# Patient Record
Sex: FEMALE | Race: WHITE | NOT HISPANIC OR LATINO | Employment: PART TIME | URBAN - METROPOLITAN AREA
[De-identification: names, ages, dates, MRNs, and addresses within clinical notes are randomized per-mention and may not be internally consistent; named-entity substitution may affect disease eponyms.]

---

## 2021-05-25 ENCOUNTER — RECORDS - HEALTHEAST (OUTPATIENT)
Dept: ADMINISTRATIVE | Facility: CLINIC | Age: 71
End: 2021-05-25

## 2022-08-04 ENCOUNTER — OFFICE VISIT (OUTPATIENT)
Dept: VASCULAR SURGERY | Facility: CLINIC | Age: 72
End: 2022-08-04
Payer: COMMERCIAL

## 2022-08-04 DIAGNOSIS — I83.93 SPIDER VEINS OF BOTH LOWER EXTREMITIES: Primary | ICD-10-CM

## 2022-08-04 PROCEDURE — 99202 OFFICE O/P NEW SF 15 MIN: CPT | Performed by: SURGERY

## 2022-08-04 NOTE — PROGRESS NOTES
After Visit Follow Up  Per Dr. Elam, patient was recommended to have 2 - 4 sessions at $400 of cosmetic sclerotherapy.    Reviewed with and gave to patient our vein clinic sclerotherapy packet of information which includes basic sclerotherapy information, pre-treatment instructions and post-treatment instructions. Discussed compression hose. Explained to patient if insurance doesn't cover the compression hose (which they probably do not) there are a couple different options ([employer] from [employer] or compression giddy) to getting them and to call the clinic to let us know if they need help. Pt is aware she needs PETITE length.    Patient in agreement with plan and had no further questions.    Natalia Colindres RN  [facility]  Vein Clinic

## 2022-08-04 NOTE — PATIENT INSTRUCTIONS
Sclerotherapy: Pre-Treatment Instructions    Recommended Sessions:  ______ treatment sessions    Pricing: Full session - $407                 *Payment is due at the time of visit following the treatment    Time Required per Treatment Session - About 45 minutes  Please come in 15 minutes before your scheduled appointment.  30 min.  Sclerotherapy treatments last approximately 30 minutes.  5 min.    A staff member will wipe your legs off with warm water and dry them with a wash cloth.                 Then you can put your compression hose on, get dressed and check out.  10 min.  After your treatment, you will be asked to walk around for 10 minutes before you get in your car.    Medications  Five days before your appointment, discontinue aspirin (Bufferin, Anacin, etc.) and Ibuprofen (Motrin, Advil, Aleve, etc.) to reduce bruising. Resume these medications the day following the treatment.    Leg Preparation  Do not shave your legs or apply any oil, lotion or powder the night before or the day of your treatment.    Clothing  Shorts:  Bring a pair of loose, comfortable shorts to wear during your treatment (or you can choose to wear ours). Shoes: Bring comfortable shoes to accommodate the compression hose after your treatment. Do not wear flip-flops or thong-style sandals unless you have open-toe compression hose.    Photographs  Photos will be taken before each treatment. This helps monitor your progress.    Injections  The physician will inject your veins with the sclerotherapy solution chosen to meet your specific needs.    Compression Hose  Please bring your compression hose if you have them. They may also be reserved for you at our clinic. Compression hose must be worn immediately after each sclerotherapy treatment.  The hose must be compression level 20-30, and they must be worn for 24 hours straight after your treatment.  If you have never worn compression hose before, a staff member will teach you how to put  them on.             You cannot have a treatment without compression hose.               They are critical to the success of your treatment!    You may purchase your compression hose from us. We will measure you and have the hose available when you come for your treatment.    Cancellation and Rescheduling  If you need to cancel or reschedule your sclerotherapy treatment, please give our office at least 24 hour notice.    Sclerotherapy: Basic Information        What is sclerotherapy?  Sclerotherapy is a treatment for  spider  veins.  Spider  veins are small veins just under your skin that can look red, blue or purple. Most  spider  veins are only a cosmetic problem.  Spider  veins are not useful and treating them will not affect your circulation.    How does sclerotherapy work?  1.  Injections: A very small needle is used to inject a solution into the  spider  veins. The solution irritates the cells that line the vein walls. This causes the veins to collapse. The vein walls to stick together and they can no longer carry blood. Different solutions are used based on the size of the veins.  2.  Compression:  The spider veins are kept collapsed by wearing compression stockings. Your body will break down and absorb the treated veins. You wear the compression hose for 24 hours after the treatment and then for 4 more days during your waking hours only.    How does the body heal after sclerotherapy?  The process is similar to how your body heals after a bad bruise. It takes 4-6 weeks or more for the healing to be complete. When the healing is complete, the vein is no longer visible. It may take more than one treatment.    How do I get the best results?  It is important to follow the post-sclerotherapy instructions. The best results require time and patience. The injection sites will continue to heal and fade for months after a treatment. Please discuss your expectations with your doctor to keep them realistic. Your doctor  will do everything possible to meet or exceed your expectations.    How many treatments are needed?  After your initial exam, your doctor will give you an estimate of the number of treatments that may be required. It depends upon the size, type, and quantity of your  spider  veins and on the doctor's assessment, your history and expectations. You may end up needing fewer or more treatments.    How soon can I have another treatment?  Additional treatments are scheduled every 4-6 weeks to allow time for the body to respond to the previous treatment.    Common Side Effects:  Itching  The areas that were injected may itch. This is usually mild and lasts less than a day. Do not use lotions or creams on your legs until the injection sites have healed over.    Pain  It is common to have some tenderness at the injection sites. Injection of the solution can be uncomfortable, but is usually well tolerated by most patients. The tenderness is temporary, lasting 24 hours at most. Tylenol or Ibuprofen can be used, if needed, following the product directions.    Bruising  This may occur at the injections sites. Bruising may be minimized by avoiding Aspirin and Ibuprofen products for five days before each treatment session.    Hyperpigmentation  A light brown discoloration of the skin may develop along the veins in the areas injected. Approximately 20-30% of patients treated note the discoloration (which is lighter and less obvious than the veins that are being treated). The hyperpigmentation usually fades in a couple of weeks, but may take several months to a year to totally resolve. There is a 1% chance of hyperpigmentation continuing after one year.    Trapped blood  A small amount of blood may become trapped and hardened in the veins. This may feel like a knot or cord and it may look dark blue or bruised. This is a common occurrence. You may need to return before your next treatment so this area can be drained to remove the  trapped blood. This will reduce the hyperpigmentation that can occur. The chance of this occurring can be decreased with proper use of compression hose after your treatment.    Matting  Matting is the formation of new, fine  spider  veins in the area injected.  It occurs in approximately 10% of patients injected. The exact reason for this is unknown. If untreated, the matting usually resolves in 3 to 12 months, but very rarely, it can be permanent. If the matting does not fade, it can be re-injected.    Rare Side Effects:  Ulceration at injection sites  Very rarely, a small ulcer will occur at the site where a vein is injected. An ulcer can take 4 to 6 weeks to completely heal. A small scar may result.    Allergic reactions  There is a very rare incidence of an allergic reaction to the solution injected. You will be observed for such reaction and will be treated appropriately should it occur. Please inform us of any allergy history.    Pulmonary embolus/Deep vein thrombosis  This is a blood clot which moves to the lungs/a blood clot in the deep vein system. There is an extraordinarily low incidence of this complication.      SCLEROTHERAPY AFTER CARE  Immediately:  After treatment, walk for 10-15 minutes before getting in your car.  If your trip home is more than 1 hour, stop and walk around for 5-10 minutes. Avoid sitting or standing for extended periods.   First 24 hours: Wear your compression continuously, even while in bed. After the 24 hours, you may shower if you want to. Put your hose back on, unless you are going to bed. You should NOT wear compression to bed after the first 24 hours. You may fly the next day, but wear your compression.   For 5 days: Wear the compression hose for waking hours only. You may continue to wear them longer than 5 days if you prefer.   For days 5-7: Walking is encouraged, as it promotes efficient circulation in your veins. You may do activities that raise your heart rate, but do  NOT run, jog, do high impact aerobics, or weight lifting. After 7 days, no activity restrictions.  Shaving: Wait a few days to shave or apply lotion.   Bathing: Do NOT take hot baths or sit in a hot tub for 7-10 days.    For 1 year: Wear SPF 30 sunscreen on your legs when in the sun. This is very important! It helps prevent darkening of the skin at the injection sites.   Medications: You may resume your usual medications, including aspirin or ibuprofen.    Common Things to Expect       Compression must be worn for the first 24 hours and then during the day for 5-7 days.    If larger veins are treated with ultrasound-guided sclerotherapy, you will have redness, firmness, tenderness, and swelling.  This firmness and tenderness may take 3-6 months to resolve. Ibuprofen and compression hose will aid in this process.    You will have bruising that can last up to 3 weeks. Most fading of the veins will occur between 3 and 6 weeks after treatment.    You may notice brown discoloration (hyperpigmentation) at the treatment site.  This should fade with time, but will take 3 months to 1 year to fully heal.     Some treated veins may look darker because of trapped blood within the vein. This trapped blood can be removed at a minimum of 1 month following treatment. Larger veins are more likely to develop trapped blood.    It is very important for you to use at least SPF 30 sunscreen in order to help prevent the discoloration of your skin.    Migraines rarely occur following sclerotherapy, but are more likely in patients with a history of migraines.  Treat as you would any other migraine.      [employer] last reviewed this educational content on 11/1/2019 2000-2021 [employer]. All rights reserved. This information is not intended as a substitute for professional medical care. Always follow your healthcare professional's instructions.

## 2022-08-04 NOTE — NURSING NOTE
Patient Reported symptoms:     Bilateral leg   Heaviness Some of the time   Achiness A little of the time   Swelling Most of the time   Throbbing None of the time   Itching Some of the time   Appearance Very noticeable   Impact on work/activities Symptoms but full able to participate

## 2022-08-04 NOTE — PROGRESS NOTES
Noemi Mccord is a very pleasant 72-year-old female who is accompanied by her sister today.  She comes to us with unsightly bilateral thigh spider veins.  She also has scattered varicose veins in both calf areas.  She really does not feel pain in the varicose veins but is concerned about the appearance of the spider veins.    He has a standing job and she works as a .  She previously had a job at [employer].    On examination she has large and multiple clusters of spider veins in both the thighs, anteriorly and posteriorly.  She has smaller cluster of nontender varicose veins in both calf areas.    I explained to her that treatment of varicose veins would not be necessary since she has not reported any real symptoms.  Treatment of spider veins is cosmetic.  I have given her a prescription of thigh-high compression stockings.  We will plan to do this sometime in October of this year when the weather is cooler so she can wear the compression stockings.  At this point she wants to enjoy her summers and be outside and in the sun.    We look forward to seeing her in the fall of this year for cosmetic bilateral thigh injection sclerotherapy.

## 2022-10-06 ENCOUNTER — OFFICE VISIT (OUTPATIENT)
Dept: VASCULAR SURGERY | Facility: CLINIC | Age: 72
End: 2022-10-06
Payer: COMMERCIAL

## 2022-10-06 DIAGNOSIS — I83.93 SPIDER VEINS OF BOTH LOWER EXTREMITIES: Primary | ICD-10-CM

## 2022-10-06 PROCEDURE — 36468 NJX SCLRSNT SPIDER VEINS: CPT | Performed by: SURGERY

## 2022-10-06 PROCEDURE — S9999 SALES TAX: HCPCS | Mod: 25 | Performed by: SURGERY

## 2022-10-06 NOTE — PROGRESS NOTES
Vein Clinic Sclerotherapy Note     Noemi Mccord is a 72 year old female who was seen in clinic today for Sclerotherapy.    Sclerotherapy    Date/Time: 10/6/2022 3:31 PM  Performed by: Bonilla Elam MD  Authorized by: Bonilla Elam MD     Type:  Cosmetic  Session:  Full  Procedure side:  Right  Solution/Amount:  .5 POLIDOCANOL  Syringes:  10  Patient tolerance:  Patient tolerated the procedure well with no immediate complications  Wrap/Hose:  Charles Elam M.D.

## 2022-12-01 ENCOUNTER — OFFICE VISIT (OUTPATIENT)
Dept: VASCULAR SURGERY | Facility: CLINIC | Age: 72
End: 2022-12-01
Payer: COMMERCIAL

## 2022-12-01 DIAGNOSIS — I83.93 SPIDER VEINS OF BOTH LOWER EXTREMITIES: Primary | ICD-10-CM

## 2022-12-01 PROCEDURE — 99212 OFFICE O/P EST SF 10 MIN: CPT | Performed by: SURGERY

## 2022-12-01 NOTE — PROGRESS NOTES
Mrs. Noemi Mccord underwent cosmetic sclerotherapy of right lower extremity spider veins on 6 October 2022.  On evaluation today there are multiple thrombosed clusters of spider veins in the right lower extremity which were treated previously.    I just do not think that they have shown the kind of progress either 1 of us expected.  I have asked her to start massaging the area of the thrombosed spider veins with vitamin A&E ointment at least once a day to effect distribution and improve the cosmetic appearance of those.  Initially our plan was to do sclerotherapy of the varicose veins of the left lower extremity but I will postpone that till I get a better result with the right side.

## 2023-03-02 ENCOUNTER — OFFICE VISIT (OUTPATIENT)
Dept: VASCULAR SURGERY | Facility: CLINIC | Age: 73
End: 2023-03-02
Payer: COMMERCIAL

## 2023-03-02 DIAGNOSIS — I83.93 SPIDER VEINS OF BOTH LOWER EXTREMITIES: Primary | ICD-10-CM

## 2023-03-02 PROCEDURE — S9999 SALES TAX: HCPCS | Performed by: SURGERY

## 2023-03-02 PROCEDURE — 36468 NJX SCLRSNT SPIDER VEINS: CPT | Performed by: SURGERY

## 2023-03-02 NOTE — PROGRESS NOTES
Vein Clinic Procedure Note    Sclerotherapy    Date/Time: 3/2/2023 12:20 PM  Performed by: Bonilla Elam MD  Authorized by: Bonilla Elam MD     Type:  Cosmetic  Session:  Full  Procedure side:  Left  Solution/Amount:  .5 POLIDOCANOL  Syringes:  10  Patient tolerance:  Patient tolerated the procedure well with no immediate complications  Wrap/Hose:  Charles Elam MD

## 2023-04-06 ENCOUNTER — OFFICE VISIT (OUTPATIENT)
Dept: VASCULAR SURGERY | Facility: CLINIC | Age: 73
End: 2023-04-06
Payer: COMMERCIAL

## 2023-04-06 DIAGNOSIS — I78.1 ASYMPTOMATIC SPIDER VEINS OF BOTH LOWER EXTREMITIES: Primary | ICD-10-CM

## 2023-04-06 PROCEDURE — 99207 PR VEINSOLUTIONS POST OPERATIVE VISIT: CPT | Performed by: SURGERY

## 2023-04-06 NOTE — PROGRESS NOTES
Mrs. Mccord underwent left lower extremity injection sclerotherapy on 2 March 2023.  She has had no complaints.  I did a thorough review of her lower extremity and she had thrombosed off most of the spider veins that had been treated.  These, however, still in the phases of thrombosis.  I would not advise any additional injections today.    We will see her back in 2 months and reassess if she needs additional injections.

## 2023-06-08 ENCOUNTER — OFFICE VISIT (OUTPATIENT)
Dept: VASCULAR SURGERY | Facility: CLINIC | Age: 73
End: 2023-06-08
Payer: COMMERCIAL

## 2023-06-08 DIAGNOSIS — I78.1 ASYMPTOMATIC SPIDER VEINS OF BOTH LOWER EXTREMITIES: Primary | ICD-10-CM

## 2023-06-08 PROCEDURE — 99207 PR VEINSOLUTIONS NO CHARGE VISIT: CPT | Performed by: SURGERY

## 2023-06-08 NOTE — PROGRESS NOTES
After Visit Follow Up  Per Dr. Elam, patient was recommended to have 2 - 4 sessions at $407 of cosmetic sclerotherapy.    Reviewed with and gave to patient our vein clinic sclerotherapy packet of information which includes basic sclerotherapy information, pre-treatment instructions and post-treatment instructions.    Patient in agreement with plan and had no further questions.    Macy Hunter Houselog on 6/8/2023 at 11:39 AM

## 2023-06-08 NOTE — PROGRESS NOTES
This is a nonbillable visit.    Noemi is here for an opinion.  She has had bilateral lower extremity sclerotherapy for cosmetic reasons.  She still has residual veins that will need treatment.  She unfortunately only brought 1 compression stocking and I think both legs warrant treatment.    We will schedule a follow-up visit.  She can bring both compression stockings.  We will do 1 full session with 10 injections.

## 2023-07-13 ENCOUNTER — OFFICE VISIT (OUTPATIENT)
Dept: VASCULAR SURGERY | Facility: CLINIC | Age: 73
End: 2023-07-13
Payer: COMMERCIAL

## 2023-07-13 DIAGNOSIS — I83.93 SPIDER VEINS OF BOTH LOWER EXTREMITIES: Primary | ICD-10-CM

## 2023-07-13 PROCEDURE — S9999 SALES TAX: HCPCS | Performed by: SURGERY

## 2023-07-13 PROCEDURE — 36468 NJX SCLRSNT SPIDER VEINS: CPT | Performed by: SURGERY

## 2023-07-13 NOTE — PROGRESS NOTES
Vein Clinic Procedure Note    Sclerotherapy    Date/Time: 7/13/2023 1:57 PM    Performed by: Bonilla Elam MD  Authorized by: Bonilla Elam MD    Type:  Cosmetic  Session:  Full  Procedure side:  Bilateral  Solution/Amount:  .5 POLIDOCANOL  Syringes:  10  Patient tolerance:  Patient tolerated the procedure well with no immediate complications  Wrap/Hose:  Charles Elam MD

## 2023-07-13 NOTE — LETTER
7/13/2023         RE: Noemi Mccord  [address]  [place] [facility] [address]        Dear Colleague,    Thank you for referring your patient, Noemi Mccord, to the [facility] VEIN CLIN[facility] [place]. Please see a copy of my visit note below.        Vein Clinic Procedure Note    Sclerotherapy    Date/Time: 7/13/2023 1:57 PM    Performed by: Bonilla Elam MD  Authorized by: Bonilla Elam MD    Type:  Cosmetic  Session:  Full  Procedure side:  Bilateral  Solution/Amount:  .5 POLIDOCANOL  Syringes:  10  Patient tolerance:  Patient tolerated the procedure well with no immediate complications  Wrap/Hose:  Hose        Bonilla Elam MD      Again, thank you for allowing me to participate in the care of your patient.        Sincerely,        Bonilla Elam MD

## 2023-10-12 ENCOUNTER — OFFICE VISIT (OUTPATIENT)
Dept: VASCULAR SURGERY | Facility: CLINIC | Age: 73
End: 2023-10-12
Payer: COMMERCIAL

## 2023-10-12 DIAGNOSIS — I83.93 SPIDER VEINS OF BOTH LOWER EXTREMITIES: Primary | ICD-10-CM

## 2023-10-12 PROCEDURE — S9999 SALES TAX: HCPCS | Performed by: SURGERY

## 2023-10-12 PROCEDURE — 36468 NJX SCLRSNT SPIDER VEINS: CPT | Performed by: SURGERY

## 2023-10-12 NOTE — PROGRESS NOTES
Vein Clinic Procedure Note    Sclerotherapy    Date/Time: 10/12/2023 1:31 PM    Performed by: Bonilla Elam MD  Authorized by: Bonilla Elam MD    Type:  Cosmetic  Session:  Limited  Procedure side:  Bilateral  Solution/Amount:  .5 POLIDOCANOL  Syringes:  4  Wrap/Hose:  Charles Elam MD

## 2023-10-12 NOTE — LETTER
10/12/2023         RE: Noemi Mccord  [address] [place] [address]        Dear Colleague,    Thank you for referring your patient, Noemi Mccord, to the [facility] VEIN CLINIC [place]. Please see a copy of my visit note below.        Vein Clinic Procedure Note    Sclerotherapy    Date/Time: 10/12/2023 1:31 PM    Performed by: Bonilla Elam MD  Authorized by: Bonilla Elam MD    Type:  Cosmetic  Session:  Limited  Procedure side:  Bilateral  Solution/Amount:  .5 POLIDOCANOL  Syringes:  4  Wrap/Hose:  Hose    Bonilla Elam MD    Again, thank you for allowing me to participate in the care of your patient.        Sincerely,        Bonilla Elam MD

## 2024-01-18 ENCOUNTER — OFFICE VISIT (OUTPATIENT)
Dept: VASCULAR SURGERY | Facility: CLINIC | Age: 74
End: 2024-01-18
Payer: COMMERCIAL

## 2024-01-18 DIAGNOSIS — I83.93 SPIDER VEINS OF BOTH LOWER EXTREMITIES: Primary | ICD-10-CM

## 2024-01-18 PROCEDURE — 99212 OFFICE O/P EST SF 10 MIN: CPT | Performed by: SURGERY

## 2024-01-18 NOTE — PROGRESS NOTES
Ms. Noemi Mccord has had multiple sessions of injection sclerotherapy to both lower extremities.  And majority of the large and unsightly spider veins and reticular veins are now treated.  She has a few small spider vein/telangiectasia clusters.  She feels she is done with the treatment and I agree.  I have given her the advice of continuing to stay active and wearing compression stockings for overall venous health.    She can follow-up as needed.

## 2024-01-18 NOTE — LETTER
1/18/2024         RE: Noemi Mccord  [address] [place] [address]        Dear Colleague,    Thank you for referring your patient, Noemi Mccord, to the [facility] VEIN CLINIC [place]. Please see a copy of my visit note below.    Ms. Noemi Mccord has had multiple sessions of injection sclerotherapy to both lower extremities.  And majority of the large and unsightly spider veins and reticular veins are now treated.  She has a few small spider vein/telangiectasia clusters.  She feels she is done with the treatment and I agree.  I have given her the advice of continuing to stay active and wearing compression stockings for overall venous health.    She can follow-up as needed.      Again, thank you for allowing me to participate in the care of your patient.        Sincerely,        Bonilla Elam MD